# Patient Record
Sex: MALE | Race: BLACK OR AFRICAN AMERICAN
[De-identification: names, ages, dates, MRNs, and addresses within clinical notes are randomized per-mention and may not be internally consistent; named-entity substitution may affect disease eponyms.]

---

## 2020-01-09 ENCOUNTER — HOSPITAL ENCOUNTER (EMERGENCY)
Dept: HOSPITAL 59 - ER | Age: 45
Discharge: HOME | End: 2020-01-09
Payer: COMMERCIAL

## 2020-01-09 DIAGNOSIS — M79.622: ICD-10-CM

## 2020-01-09 DIAGNOSIS — R06.02: ICD-10-CM

## 2020-01-09 DIAGNOSIS — R07.2: Primary | ICD-10-CM

## 2020-01-09 DIAGNOSIS — F17.210: ICD-10-CM

## 2020-01-09 DIAGNOSIS — M79.621: ICD-10-CM

## 2020-01-09 LAB
ABSOLUTE NEUTROPHIL COUNT: 2.77
ALBUMIN SERPL-MCNC: 4 G/DL (ref 4–5)
ALBUMIN/GLOB SERPL: 1.5 {RATIO} (ref 1.1–1.8)
ALP SERPL-CCNC: 51 U/L (ref 40–129)
ALT SERPL-CCNC: 24 U/L (ref ?–41)
ANION GAP SERPL CALC-SCNC: 11 MMOL/L (ref 7–16)
APTT BLD: 22.2 SECONDS (ref 24.5–39.1)
AST SERPL-CCNC: 23 U/L (ref 10–50)
BASOPHILS NFR BLD: 0.4 % (ref 0–6)
BILIRUB SERPL-MCNC: 0.2 MG/DL (ref 0.2–1)
BUN SERPL-MCNC: 15 MG/DL (ref 6–20)
CO2 SERPL-SCNC: 26 MMOL/L (ref 22–29)
CREAT SERPL-MCNC: 0.9 MG/DL (ref 0.7–1.2)
EOSINOPHIL NFR BLD: 1.4 % (ref 0–6)
ERYTHROCYTE [DISTWIDTH] IN BLOOD BY AUTOMATED COUNT: 14.3 % (ref 11.5–14.5)
EST GLOMERULAR FILTRATION RATE: > 60 ML/MIN
GLOBULIN SER-MCNC: 2.7 GM/DL (ref 1.4–4.8)
GLUCOSE SERPL-MCNC: 135 MG/DL (ref 74–109)
GRANULOCYTES NFR BLD: 39.6 % (ref 47–80)
HCT VFR BLD CALC: 39.8 % (ref 42–52)
HGB BLD-MCNC: 14.5 GM/DL (ref 14–18)
INR PPP: 1
LYMPHOCYTES NFR BLD AUTO: 48.6 % (ref 16–45)
MCH RBC QN AUTO: 31.8 PG (ref 27–33)
MCHC RBC AUTO-ENTMCNC: 36.4 G/DL (ref 32–36)
MCV RBC AUTO: 87.3 FL (ref 81–97)
MONOCYTES NFR BLD: 10 % (ref 0–9)
PLATELET # BLD: 259 K/UL (ref 130–400)
PMV BLD AUTO: 9.9 FL (ref 7.4–10.4)
PROT SERPL-MCNC: 6.7 G/DL (ref 6.6–8.7)
PROTHROMBIN TIME: 10.2 SECONDS (ref 9.5–12.1)
RBC # BLD AUTO: 4.56 M/UL (ref 4.4–5.7)
WBC # BLD AUTO: 7 K/UL (ref 4.2–12.2)

## 2020-01-09 PROCEDURE — 85610 PROTHROMBIN TIME: CPT

## 2020-01-09 PROCEDURE — 84484 ASSAY OF TROPONIN QUANT: CPT

## 2020-01-09 PROCEDURE — 93010 ELECTROCARDIOGRAM REPORT: CPT

## 2020-01-09 PROCEDURE — 93017 CV STRESS TEST TRACING ONLY: CPT

## 2020-01-09 PROCEDURE — 71046 X-RAY EXAM CHEST 2 VIEWS: CPT

## 2020-01-09 PROCEDURE — 93306 TTE W/DOPPLER COMPLETE: CPT

## 2020-01-09 PROCEDURE — 80053 COMPREHEN METABOLIC PANEL: CPT

## 2020-01-09 PROCEDURE — 99284 EMERGENCY DEPT VISIT MOD MDM: CPT

## 2020-01-09 PROCEDURE — 93005 ELECTROCARDIOGRAM TRACING: CPT

## 2020-01-09 PROCEDURE — 85730 THROMBOPLASTIN TIME PARTIAL: CPT

## 2020-01-09 PROCEDURE — 96376 TX/PRO/DX INJ SAME DRUG ADON: CPT

## 2020-01-09 PROCEDURE — 96374 THER/PROPH/DIAG INJ IV PUSH: CPT

## 2020-01-09 PROCEDURE — 85025 COMPLETE CBC W/AUTO DIFF WBC: CPT

## 2020-01-09 PROCEDURE — 85379 FIBRIN DEGRADATION QUANT: CPT

## 2020-01-09 PROCEDURE — 93356 MYOCRD STRAIN IMG SPCKL TRCK: CPT

## 2020-01-09 NOTE — RADIOLOGY REPORT
EXAMINATION: Two View Chest Radiographs

EXAM DATE:  1/9/2020 2:17 PM



TECHNIQUE:  Frontal and lateral views



INDICATION:  chest pain

COMPARISON:  None



ENCOUNTER: Not applicable

_________________________



FINDINGS:



The heart, mediastinum, and pulmonary vasculature are normal.   No lung consolidation or pleural effu
sions are present. No pneumothorax. Osseous structures unremarkable.

_________________________



IMPRESSION:



No acute process.



Dictated by: Willie Negrete DO on 1/9/2020 2:29 PM.

Electronically signed by: Willie Negrete DO on 1/9/2020 2:29 PM.

## 2020-01-09 NOTE — EMERGENCY DEPARTMENT RECORD
History of Present Illness





- General


Chief Complaint: Chest Pain


Stated Complaint: CHEST PAIN/ARM PAIN


Time Seen by Provider: 01/09/20 11:20


Source: Patient


Mode of Arrival: Ambulatory


Limitations: No limitations





- History of Present Illness


Initial Comments: 





45 yo male presents with chest pain for the last 30 minutes.  He states the 

onset was while at work.  The pain is in the middle of the chest.  He states it 

is an ache.  He has bilateral arm discomfort as well.  No radiation to the back.

 No jaw or neck pain.  No cough or recent illness.  He is a smoker.  He feels a 

little short of breath.  No leg pain or swelling.  No past medical history.  He 

does not have a doctor.  No current medications.  No other specific complaints. 

No pain with deep inspiration. 


MD Complaint: Chest pain


-: Minutes(s) (30)


Pain Location: Substernal


Pain Radiation: RUE, LUE


Severity: Moderate


Quality: Aching


Consistency: Constant


Improves With: Nothing


Worsens With: Nothing


Context: Other


Anginal Symptoms: Dyspnea


Other Symptoms: Other


Treatments Prior to Arrival: None





Review of Systems


Constitutional: Denies: Chills, Fever, Malaise, Weakness


Eyes: Denies: Eye discharge


ENT: Denies: Congestion, Throat pain


Respiratory: Reports: Dyspnea.  Denies: Cough


Cardiovascular: Reports: Chest pain, Palpitations.  Denies: Edema, Syncope


Endocrine: Denies: Fatigue, Polydipsia, Polyuria


Gastrointestinal: Denies: Abdominal pain, Diarrhea, Nausea, Vomiting


Genitourinary: Denies: Dysuria, Frequency, Hematuria


Musculoskeletal: Reports: Myalgia.  Denies: Arthralgia, Back pain, Joint 

swelling


Skin: Denies: Bruising, Change in color, Rash


Neurological: Denies: Headache, Numbness, Tremors, Weakness


Psychiatric: Denies: Anxiety


Hematological/Lymphatic: Denies: Easy bleeding, Easy bruising





Physical Exam





- General


General Appearance: Alert, Oriented x3, Cooperative, No acute distress


Limitations: No limitations





- Head


Head exam: Atraumatic, Normal inspection





- Eye


Eye exam: Normal appearance, PERRL.  negative: Conjunctival injection, Scleral 

icterus





- ENT


ENT exam: Normal exam, Mucous membranes moist


Ear exam: Normal external inspection


Nasal Exam: Normal inspection


Mouth exam: Normal external inspection





- Neck


Neck exam: Normal inspection, Full ROM





- Respiratory


Respiratory exam: Normal lung sounds bilaterally.  negative: Respiratory 

distress, Rhonchi, Stridor, Wheezes





- Cardiovascular


Cardiovascular Exam: Regular rate, Normal rhythm, Normal heart sounds





- GI/Abdominal


GI/Abdominal exam: Soft.  negative: Tenderness





- Rectal


Rectal exam: Deferred





- 


 exam: Deferred





- Extremities


Extremities exam: Normal inspection.  negative: Pedal edema, Tenderness





- Back


Back exam: Denies: CVA tenderness (R), CVA tenderness (L)





- Neurological


Neurological exam: Alert, Oriented X3





- Psychiatric


Psychiatric exam: Normal affect, Normal mood.  negative: Agitated, Anxious





- Skin


Skin exam: Dry, Intact, Normal color, Warm





Course





- Reevaluation(s)


Reevaluation #1: 


The vitals were reviewed


01/09/20 11:27


EKG #1:  11:20


Rate:  80


Rhythm:  sinus


Axis:   normal


Intervals:   normal


ST segments:  LVH, no acute ST changes


Prior:  none


01/09/20 11:38





The EKG was reviewed with Dr Olsen.


No acute changes.  The plan will be serial enzymes in the ED, ECHO and possible 

stress test in the afternoon if 2 sets are negative





01/09/20 11:39


EKG #2:  11:37


Rate:  84


Rhythm:  sinus


Axis:   normal


Intervals:   normal


ST segments:  LVH, No acute ST changes, no changes from #1


Prior:  #1


No dynamic changes from EKG #1 to #2


01/09/20 11:44





01/09/20 11:55


No acute changes on the CBC, CMP


01/09/20 11:57


Troponin is normal


01/09/20 13:54


The patient is sleeping comfortably


The ECHO was completed


The D-Dimer is negative


01/09/20 14:05


Waiting for ECHO report.


Waking up the patient denies any chest pain


01/09/20 14:55


Repeat troponin is normal


Stress test ordered


01/09/20 14:56


Normal CXR


01/09/20 15:10


The ECHO report was reviewed.  Normal with normal EF


01/09/20 16:16


The Stress test was completed.  The patient remained asymptomatic and tolerated 

well


The preliminary report was it was negative for acute changes.


Review this ER visit and the tests performed with your family doctor


Call your doctor for the next available follow up appointment


Return to the ER for a recheck immediately if worse, any new concerns or 

questions





Medical Decision Making





- Lab Data


Result diagrams: 


                                 01/09/20 11:24





                                 01/09/20 11:24





Disposition


Disposition: Discharge


Clinical Impression: 


 Chest pain





Disposition: Home, Self-Care


Condition: (1) Good


Instructions:  Chest Pain (ED)


Additional Instructions: 


Review this ER visit and the tests performed with your family doctor


Call your doctor for the next available follow up appointment


Return to the ER for a recheck immediately if worse, any new concerns or q

uestions


Forms:  Patient Portal Access


Time of Disposition: 16:17





Quality





- Quality Measures


Quality Measures: N/A





- Blood Pressure Screening


Does Patient Have Any of the Following: No


Blood Pressure Classification: Hypertensive Reading


Systolic Measurement: 166


Diastolic Measurement: 102


Screening for High Blood Pressure: < Pre-Hypertensive BP, F/U Documented > 

[]


Pre-Hypertensive Follow-up Interventions: Referral to alternative/primary care 

provider.

## 2020-01-17 NOTE — STRESS TEST REPORT
DATE OF TEST:  01/09/2020



INDICATION:  Chest pain.  



Mr. Richey's resting vital signs show a blood pressure of 149/93 mmHg and the 
resting ECG shows a normal sinus rhythm at 72 b.p.m.  Normal QRS axis and normal
intervals.  



The patient was exercised on a motorized treadmill using standard Pa protocol
for a total of 7 minutes 21 seconds achieving 9 METS of workload.  The patient 
achieved a maximum heart rate of 150 b.p.m. which was 85% of the maximum 
predicted heart rate for his age.  



The patient's peak blood pressure during the stress test was 154/85 mmHg.  The 
patient did not complain of any abnormal symptoms during the stress test.  



The patient's ECG does not show any ST or T-wave changes suggestive of ischemia.
 No arrhythmia was seen either.  



IMPRESSION:

1.  NORMAL ECG RESPONSE TO EXERCISE STRESS WHILE ACHIEVING 9 METS OF WORKLOAD.  

2.  AVERAGE EXERCISE CAPACITY FOR AGE.  



JOB NUMBER:  [id number]

MTDD